# Patient Record
Sex: MALE | Race: OTHER | HISPANIC OR LATINO
[De-identification: names, ages, dates, MRNs, and addresses within clinical notes are randomized per-mention and may not be internally consistent; named-entity substitution may affect disease eponyms.]

---
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
## 2022-07-14 ENCOUNTER — INPATIENT (INPATIENT)
Facility: HOSPITAL | Age: 22
LOS: 0 days | Discharge: HOME | DRG: 343 | End: 2022-07-15
Attending: HOSPITALIST | Admitting: INTERNAL MEDICINE
Payer: COMMERCIAL

## 2022-07-14 ENCOUNTER — TRANSCRIPTION ENCOUNTER (OUTPATIENT)
Age: 22
End: 2022-07-14

## 2022-07-14 VITALS
OXYGEN SATURATION: 98 % | WEIGHT: 190.04 LBS | SYSTOLIC BLOOD PRESSURE: 128 MMHG | RESPIRATION RATE: 16 BRPM | TEMPERATURE: 100 F | HEIGHT: 71 IN | DIASTOLIC BLOOD PRESSURE: 78 MMHG | HEART RATE: 70 BPM

## 2022-07-14 DIAGNOSIS — K35.80 UNSPECIFIED ACUTE APPENDICITIS: ICD-10-CM

## 2022-07-14 LAB
ALBUMIN SERPL ELPH-MCNC: 4.3 G/DL — SIGNIFICANT CHANGE UP (ref 3.3–5)
ALP SERPL-CCNC: 92 U/L — SIGNIFICANT CHANGE UP (ref 40–120)
ALT FLD-CCNC: 23 U/L — SIGNIFICANT CHANGE UP (ref 10–45)
ANION GAP SERPL CALC-SCNC: 11 MMOL/L — SIGNIFICANT CHANGE UP (ref 5–17)
APPEARANCE UR: CLEAR — SIGNIFICANT CHANGE UP
APTT BLD: 31.2 SEC — SIGNIFICANT CHANGE UP (ref 27.5–35.5)
AST SERPL-CCNC: 22 U/L — SIGNIFICANT CHANGE UP (ref 10–40)
BILIRUB SERPL-MCNC: 0.6 MG/DL — SIGNIFICANT CHANGE UP (ref 0.2–1.2)
BILIRUB UR-MCNC: NEGATIVE — SIGNIFICANT CHANGE UP
BLD GP AB SCN SERPL QL: SIGNIFICANT CHANGE UP
BUN SERPL-MCNC: 12 MG/DL — SIGNIFICANT CHANGE UP (ref 7–23)
CALCIUM SERPL-MCNC: 9.4 MG/DL — SIGNIFICANT CHANGE UP (ref 8.4–10.5)
CHLORIDE SERPL-SCNC: 102 MMOL/L — SIGNIFICANT CHANGE UP (ref 96–108)
CO2 SERPL-SCNC: 27 MMOL/L — SIGNIFICANT CHANGE UP (ref 22–31)
COLOR SPEC: YELLOW — SIGNIFICANT CHANGE UP
CREAT SERPL-MCNC: 1.05 MG/DL — SIGNIFICANT CHANGE UP (ref 0.5–1.3)
DIFF PNL FLD: ABNORMAL
EGFR: 105 ML/MIN/1.73M2 — SIGNIFICANT CHANGE UP
GLUCOSE SERPL-MCNC: 125 MG/DL — HIGH (ref 70–99)
GLUCOSE UR QL: NEGATIVE — SIGNIFICANT CHANGE UP
HCT VFR BLD CALC: 38.2 % — LOW (ref 39–50)
HGB BLD-MCNC: 12.8 G/DL — LOW (ref 13–17)
INR BLD: 1.13 RATIO — SIGNIFICANT CHANGE UP (ref 0.88–1.16)
KETONES UR-MCNC: NEGATIVE — SIGNIFICANT CHANGE UP
LACTATE SERPL-SCNC: 1.2 MMOL/L — SIGNIFICANT CHANGE UP (ref 0.7–2)
LEUKOCYTE ESTERASE UR-ACNC: NEGATIVE — SIGNIFICANT CHANGE UP
LIDOCAIN IGE QN: 113 U/L — SIGNIFICANT CHANGE UP (ref 73–393)
MCHC RBC-ENTMCNC: 30.4 PG — SIGNIFICANT CHANGE UP (ref 27–34)
MCHC RBC-ENTMCNC: 33.5 GM/DL — SIGNIFICANT CHANGE UP (ref 32–36)
MCV RBC AUTO: 90.7 FL — SIGNIFICANT CHANGE UP (ref 80–100)
NITRITE UR-MCNC: NEGATIVE — SIGNIFICANT CHANGE UP
NRBC # BLD: 0 /100 WBCS — SIGNIFICANT CHANGE UP (ref 0–0)
PH UR: 7 — SIGNIFICANT CHANGE UP (ref 5–8)
PLATELET # BLD AUTO: 275 K/UL — SIGNIFICANT CHANGE UP (ref 150–400)
POTASSIUM SERPL-MCNC: 3.7 MMOL/L — SIGNIFICANT CHANGE UP (ref 3.5–5.3)
POTASSIUM SERPL-SCNC: 3.7 MMOL/L — SIGNIFICANT CHANGE UP (ref 3.5–5.3)
PROT SERPL-MCNC: 8.1 G/DL — SIGNIFICANT CHANGE UP (ref 6–8.3)
PROT UR-MCNC: NEGATIVE — SIGNIFICANT CHANGE UP
PROTHROM AB SERPL-ACNC: 13.1 SEC — SIGNIFICANT CHANGE UP (ref 10.5–13.4)
RBC # BLD: 4.21 M/UL — SIGNIFICANT CHANGE UP (ref 4.2–5.8)
RBC # FLD: 12.2 % — SIGNIFICANT CHANGE UP (ref 10.3–14.5)
RBC CASTS # UR COMP ASSIST: SIGNIFICANT CHANGE UP /HPF (ref 0–4)
SARS-COV-2 RNA SPEC QL NAA+PROBE: SIGNIFICANT CHANGE UP
SODIUM SERPL-SCNC: 140 MMOL/L — SIGNIFICANT CHANGE UP (ref 135–145)
SP GR SPEC: 1.01 — SIGNIFICANT CHANGE UP (ref 1.01–1.02)
UROBILINOGEN FLD QL: NEGATIVE — SIGNIFICANT CHANGE UP
WBC # BLD: 15.75 K/UL — HIGH (ref 3.8–10.5)
WBC # FLD AUTO: 15.75 K/UL — HIGH (ref 3.8–10.5)
WBC UR QL: NEGATIVE /HPF — SIGNIFICANT CHANGE UP (ref 0–5)

## 2022-07-14 PROCEDURE — 74177 CT ABD & PELVIS W/CONTRAST: CPT | Mod: 26,MA

## 2022-07-14 PROCEDURE — 99222 1ST HOSP IP/OBS MODERATE 55: CPT

## 2022-07-14 PROCEDURE — 99285 EMERGENCY DEPT VISIT HI MDM: CPT

## 2022-07-14 RX ORDER — ACETAMINOPHEN 500 MG
650 TABLET ORAL EVERY 6 HOURS
Refills: 0 | Status: DISCONTINUED | OUTPATIENT
Start: 2022-07-14 | End: 2022-07-15

## 2022-07-14 RX ORDER — PIPERACILLIN AND TAZOBACTAM 4; .5 G/20ML; G/20ML
3.38 INJECTION, POWDER, LYOPHILIZED, FOR SOLUTION INTRAVENOUS ONCE
Refills: 0 | Status: COMPLETED | OUTPATIENT
Start: 2022-07-14 | End: 2022-07-14

## 2022-07-14 RX ORDER — CEFTRIAXONE 500 MG/1
1000 INJECTION, POWDER, FOR SOLUTION INTRAMUSCULAR; INTRAVENOUS EVERY 24 HOURS
Refills: 0 | Status: DISCONTINUED | OUTPATIENT
Start: 2022-07-15 | End: 2022-07-15

## 2022-07-14 RX ORDER — SODIUM CHLORIDE 9 MG/ML
1000 INJECTION INTRAMUSCULAR; INTRAVENOUS; SUBCUTANEOUS ONCE
Refills: 0 | Status: COMPLETED | OUTPATIENT
Start: 2022-07-14 | End: 2022-07-14

## 2022-07-14 RX ORDER — FAMOTIDINE 10 MG/ML
20 INJECTION INTRAVENOUS ONCE
Refills: 0 | Status: COMPLETED | OUTPATIENT
Start: 2022-07-14 | End: 2022-07-14

## 2022-07-14 RX ORDER — KETOROLAC TROMETHAMINE 30 MG/ML
30 SYRINGE (ML) INJECTION ONCE
Refills: 0 | Status: DISCONTINUED | OUTPATIENT
Start: 2022-07-14 | End: 2022-07-14

## 2022-07-14 RX ADMIN — SODIUM CHLORIDE 1000 MILLILITER(S): 9 INJECTION INTRAMUSCULAR; INTRAVENOUS; SUBCUTANEOUS at 21:53

## 2022-07-14 RX ADMIN — SODIUM CHLORIDE 1000 MILLILITER(S): 9 INJECTION INTRAMUSCULAR; INTRAVENOUS; SUBCUTANEOUS at 23:20

## 2022-07-14 RX ADMIN — PIPERACILLIN AND TAZOBACTAM 3.38 GRAM(S): 4; .5 INJECTION, POWDER, LYOPHILIZED, FOR SOLUTION INTRAVENOUS at 23:20

## 2022-07-14 RX ADMIN — SODIUM CHLORIDE 1000 MILLILITER(S): 9 INJECTION INTRAMUSCULAR; INTRAVENOUS; SUBCUTANEOUS at 20:48

## 2022-07-14 RX ADMIN — PIPERACILLIN AND TAZOBACTAM 200 GRAM(S): 4; .5 INJECTION, POWDER, LYOPHILIZED, FOR SOLUTION INTRAVENOUS at 22:04

## 2022-07-14 RX ADMIN — Medication 30 MILLIGRAM(S): at 20:48

## 2022-07-14 RX ADMIN — Medication 30 MILLIGRAM(S): at 22:13

## 2022-07-14 NOTE — H&P ADULT - NSHPPHYSICALEXAM_GEN_ALL_CORE
Vital Signs (24 Hrs):  T(C): 37.9 (07-14-22 @ 19:52), Max: 37.9 (07-14-22 @ 19:52)  HR: 70 (07-14-22 @ 19:52) (70 - 70)  BP: 128/78 (07-14-22 @ 19:52) (128/78 - 128/78)  RR: 16 (07-14-22 @ 19:52) (16 - 16)  SpO2: 98% (07-14-22 @ 19:52) (98% - 98%)  Wt(kg): --  Daily Height in cm: 180.34 (14 Jul 2022 19:52)    Daily     I&O's Summary

## 2022-07-14 NOTE — H&P ADULT - ASSESSMENT
Mother, Jeanie - cell [phone], home [phone] 22 yo m, healthy, no PMHx, was fine until this evening, after dinner, c/o sharp RLQ abdominal pain, 10/10, asso with nausea, had and episode of vomiting.  Pt felt very weak, diaphoretic after, abd pain persistent, so decided to come to the ED.  He denies fever, chills chest pain, diarrhea, dyspnea.    CT Abd/pelvis reports no bowel obstruction. 10 mm fluid-filled appendix with minimal inflammatory changes in the adjacent fat. Findings are suspicious for early appendicitis.    Acute Appendicitis    Admit  NPO, IV hydration while NPO  IV Antibx-Ceftriaxone  Analgesics prn  GI prophylaxis  Surg eval - DR Sebastian was notified by ED  Mother, Jeanie, requests to be called with any update or plann -her favored contact no is cell (first) [phone], then home [phone]

## 2022-07-14 NOTE — ED PROVIDER NOTE - PHYSICAL EXAMINATION
General:     NAD, well-nourished, well-appearing  Eyes: PERRL  Head:     NC/AT, EOMI, oral mucosa moist  Neck:     trachea midline  Lungs:     CTA b/l  CVS:     RRR  Abd:     RLQ ttp, no rebound or guarding, no cva tenderness  Ext:   no deformities   Neuro: AAOx3, no sensory/motor deficits

## 2022-07-14 NOTE — ED PROVIDER NOTE - OBJECTIVE STATEMENT
pt 22 yo m c/o sharp rlq pain that was a 10/10 and now 4/10 associated with nausea that now resolved. feels like it is related to him working outside and not drinking any water  denies fever, chills, dysuria, weakness, diarrhea

## 2022-07-14 NOTE — ED PROVIDER NOTE - NS ED ATTENDING STATEMENT MOD
This was a shared visit with the KEVIN. I reviewed and verified the documentation and independently performed the documented:

## 2022-07-14 NOTE — H&P ADULT - HISTORY OF PRESENT ILLNESS
20 yo m, healthy, no PMHx, was fine until this evening, after dinner, c/o sharp RLQ abdominal pain, 10/10, asso with nausea, had and episode of vomiting.  Pt felt very weak, diaphoretic after, abd pain persistent, so decided to come to the ED.  He denies fever, chills chest pain, diarrhea, dyspnea.   22 yo m, healthy, no PMHx, was fine until this evening, after dinner, c/o sharp RLQ abdominal pain, 10/10, asso with nausea, had and episode of vomiting.  Pt felt very weak, diaphoretic after, abd pain persistent, so decided to come to the ED.  He denies fever, chills chest pain, diarrhea, dyspnea.    CT Abd/pelvis reports no bowel obstruction. 10 mm fluid-filled appendix with minimal inflammatory changes in the adjacent fat. Findings are suspicious for early appendicitis.

## 2022-07-14 NOTE — ED PROVIDER NOTE - ATTENDING APP SHARED VISIT CONTRIBUTION OF CARE
21M with no sig pmh presents with 1 day of abd pain, nausea, chills. Pain started periumbilically, moved to RLQ. In the ED, febrile, tachy, normotensive. Well appearing. TTP in RLQ with guarding. Labs with leukocytosis. CTAP shows appendicitis without abscess or perforation. Pt given fluids, zosyn. Surgery consulted. Admitted for likely operative management.

## 2022-07-14 NOTE — H&P ADULT - NEUROLOGICAL
normal/cranial nerves II-XII intact/sensation intact/responds to pain/responds to verbal commands/deep reflexes intact

## 2022-07-15 ENCOUNTER — TRANSCRIPTION ENCOUNTER (OUTPATIENT)
Age: 22
End: 2022-07-15

## 2022-07-15 ENCOUNTER — RESULT REVIEW (OUTPATIENT)
Age: 22
End: 2022-07-15

## 2022-07-15 VITALS
TEMPERATURE: 98 F | OXYGEN SATURATION: 97 % | SYSTOLIC BLOOD PRESSURE: 132 MMHG | HEART RATE: 68 BPM | DIASTOLIC BLOOD PRESSURE: 77 MMHG | RESPIRATION RATE: 18 BRPM

## 2022-07-15 LAB
BASOPHILS # BLD AUTO: 0.04 K/UL — SIGNIFICANT CHANGE UP (ref 0–0.2)
BASOPHILS NFR BLD AUTO: 0.4 % — SIGNIFICANT CHANGE UP (ref 0–2)
CULTURE RESULTS: SIGNIFICANT CHANGE UP
EOSINOPHIL # BLD AUTO: 0.12 K/UL — SIGNIFICANT CHANGE UP (ref 0–0.5)
EOSINOPHIL NFR BLD AUTO: 1.1 % — SIGNIFICANT CHANGE UP (ref 0–6)
HCT VFR BLD CALC: 38.7 % — LOW (ref 39–50)
HGB BLD-MCNC: 12.8 G/DL — LOW (ref 13–17)
IMM GRANULOCYTES NFR BLD AUTO: 0.3 % — SIGNIFICANT CHANGE UP (ref 0–1.5)
LYMPHOCYTES # BLD AUTO: 2.57 K/UL — SIGNIFICANT CHANGE UP (ref 1–3.3)
LYMPHOCYTES # BLD AUTO: 23.4 % — SIGNIFICANT CHANGE UP (ref 13–44)
MCHC RBC-ENTMCNC: 29.4 PG — SIGNIFICANT CHANGE UP (ref 27–34)
MCHC RBC-ENTMCNC: 33.1 GM/DL — SIGNIFICANT CHANGE UP (ref 32–36)
MCV RBC AUTO: 88.8 FL — SIGNIFICANT CHANGE UP (ref 80–100)
MONOCYTES # BLD AUTO: 1.18 K/UL — HIGH (ref 0–0.9)
MONOCYTES NFR BLD AUTO: 10.7 % — SIGNIFICANT CHANGE UP (ref 2–14)
NEUTROPHILS # BLD AUTO: 7.05 K/UL — SIGNIFICANT CHANGE UP (ref 1.8–7.4)
NEUTROPHILS NFR BLD AUTO: 64.1 % — SIGNIFICANT CHANGE UP (ref 43–77)
NRBC # BLD: 0 /100 WBCS — SIGNIFICANT CHANGE UP (ref 0–0)
PLATELET # BLD AUTO: 273 K/UL — SIGNIFICANT CHANGE UP (ref 150–400)
RBC # BLD: 4.36 M/UL — SIGNIFICANT CHANGE UP (ref 4.2–5.8)
RBC # FLD: 12.3 % — SIGNIFICANT CHANGE UP (ref 10.3–14.5)
SPECIMEN SOURCE: SIGNIFICANT CHANGE UP
WBC # BLD: 10.99 K/UL — HIGH (ref 3.8–10.5)
WBC # FLD AUTO: 10.99 K/UL — HIGH (ref 3.8–10.5)

## 2022-07-15 PROCEDURE — 85027 COMPLETE CBC AUTOMATED: CPT

## 2022-07-15 PROCEDURE — 99222 1ST HOSP IP/OBS MODERATE 55: CPT | Mod: 25,57

## 2022-07-15 PROCEDURE — 87086 URINE CULTURE/COLONY COUNT: CPT

## 2022-07-15 PROCEDURE — 99239 HOSP IP/OBS DSCHRG MGMT >30: CPT

## 2022-07-15 PROCEDURE — 85730 THROMBOPLASTIN TIME PARTIAL: CPT

## 2022-07-15 PROCEDURE — C9399: CPT

## 2022-07-15 PROCEDURE — 87635 SARS-COV-2 COVID-19 AMP PRB: CPT

## 2022-07-15 PROCEDURE — C1889: CPT

## 2022-07-15 PROCEDURE — 96375 TX/PRO/DX INJ NEW DRUG ADDON: CPT

## 2022-07-15 PROCEDURE — 96361 HYDRATE IV INFUSION ADD-ON: CPT

## 2022-07-15 PROCEDURE — 88304 TISSUE EXAM BY PATHOLOGIST: CPT | Mod: 26

## 2022-07-15 PROCEDURE — 80053 COMPREHEN METABOLIC PANEL: CPT

## 2022-07-15 PROCEDURE — 86900 BLOOD TYPING SEROLOGIC ABO: CPT

## 2022-07-15 PROCEDURE — 86850 RBC ANTIBODY SCREEN: CPT

## 2022-07-15 PROCEDURE — 88304 TISSUE EXAM BY PATHOLOGIST: CPT

## 2022-07-15 PROCEDURE — 86901 BLOOD TYPING SEROLOGIC RH(D): CPT

## 2022-07-15 PROCEDURE — 36415 COLL VENOUS BLD VENIPUNCTURE: CPT

## 2022-07-15 PROCEDURE — 81001 URINALYSIS AUTO W/SCOPE: CPT

## 2022-07-15 PROCEDURE — 85025 COMPLETE CBC W/AUTO DIFF WBC: CPT

## 2022-07-15 PROCEDURE — 83690 ASSAY OF LIPASE: CPT

## 2022-07-15 PROCEDURE — 44970 LAPAROSCOPY APPENDECTOMY: CPT

## 2022-07-15 PROCEDURE — 74177 CT ABD & PELVIS W/CONTRAST: CPT | Mod: MA

## 2022-07-15 PROCEDURE — 96365 THER/PROPH/DIAG IV INF INIT: CPT

## 2022-07-15 PROCEDURE — 44970 LAPAROSCOPY APPENDECTOMY: CPT | Mod: AS

## 2022-07-15 PROCEDURE — 83605 ASSAY OF LACTIC ACID: CPT

## 2022-07-15 PROCEDURE — 99285 EMERGENCY DEPT VISIT HI MDM: CPT | Mod: 25

## 2022-07-15 PROCEDURE — 85610 PROTHROMBIN TIME: CPT

## 2022-07-15 DEVICE — STAPLER COVIDIEN TRI-STAPLE 45MM TAN RELOAD: Type: IMPLANTABLE DEVICE | Status: FUNCTIONAL

## 2022-07-15 DEVICE — CLIP APPLIER COVIDIEN ENDOCLIP 5MM: Type: IMPLANTABLE DEVICE | Status: FUNCTIONAL

## 2022-07-15 DEVICE — STAPLER COVIDIEN TRI-STAPLE 30MM GRAY RELOAD: Type: IMPLANTABLE DEVICE | Status: FUNCTIONAL

## 2022-07-15 DEVICE — STAPLER COVIDIEN TRI-STAPLE 60MM PURPLE RELOAD: Type: IMPLANTABLE DEVICE | Status: FUNCTIONAL

## 2022-07-15 DEVICE — STAPLER COVIDIEN TRI-STAPLE 30MM TAN RELOAD: Type: IMPLANTABLE DEVICE | Status: FUNCTIONAL

## 2022-07-15 DEVICE — STAPLER COVIDIEN TRI-STAPLE 30MM PURPLE RELOAD: Type: IMPLANTABLE DEVICE | Status: FUNCTIONAL

## 2022-07-15 DEVICE — CLIP APPLIER COVIDIEN ENDOCLIP II 10MM MED/LG: Type: IMPLANTABLE DEVICE | Status: FUNCTIONAL

## 2022-07-15 RX ORDER — SODIUM CHLORIDE 9 MG/ML
1000 INJECTION, SOLUTION INTRAVENOUS
Refills: 0 | Status: DISCONTINUED | OUTPATIENT
Start: 2022-07-15 | End: 2022-07-15

## 2022-07-15 RX ORDER — OXYCODONE HYDROCHLORIDE 5 MG/1
1 TABLET ORAL
Qty: 16 | Refills: 0
Start: 2022-07-15 | End: 2022-07-18

## 2022-07-15 RX ORDER — LIDOCAINE HCL 20 MG/ML
1 VIAL (ML) INJECTION ONCE
Refills: 0 | Status: COMPLETED | OUTPATIENT
Start: 2022-07-15 | End: 2022-07-15

## 2022-07-15 RX ORDER — SODIUM CHLORIDE 9 MG/ML
1000 INJECTION INTRAMUSCULAR; INTRAVENOUS; SUBCUTANEOUS
Refills: 0 | Status: DISCONTINUED | OUTPATIENT
Start: 2022-07-15 | End: 2022-07-15

## 2022-07-15 RX ORDER — OXYCODONE HYDROCHLORIDE 5 MG/1
10 TABLET ORAL EVERY 4 HOURS
Refills: 0 | Status: DISCONTINUED | OUTPATIENT
Start: 2022-07-15 | End: 2022-07-15

## 2022-07-15 RX ORDER — OXYCODONE HYDROCHLORIDE 5 MG/1
5 TABLET ORAL EVERY 4 HOURS
Refills: 0 | Status: DISCONTINUED | OUTPATIENT
Start: 2022-07-15 | End: 2022-07-15

## 2022-07-15 RX ORDER — ACETAMINOPHEN 500 MG
650 TABLET ORAL EVERY 6 HOURS
Refills: 0 | Status: DISCONTINUED | OUTPATIENT
Start: 2022-07-15 | End: 2022-07-15

## 2022-07-15 RX ORDER — NALOXONE HYDROCHLORIDE 4 MG/.1ML
1 SPRAY NASAL
Qty: 2 | Refills: 0
Start: 2022-07-15 | End: 2022-07-16

## 2022-07-15 RX ORDER — LIDOCAINE 4 G/100G
1 CREAM TOPICAL ONCE
Refills: 0 | Status: DISCONTINUED | OUTPATIENT
Start: 2022-07-15 | End: 2022-07-15

## 2022-07-15 RX ORDER — ONDANSETRON 8 MG/1
4 TABLET, FILM COATED ORAL ONCE
Refills: 0 | Status: DISCONTINUED | OUTPATIENT
Start: 2022-07-15 | End: 2022-07-15

## 2022-07-15 RX ORDER — ONDANSETRON 8 MG/1
4 TABLET, FILM COATED ORAL EVERY 6 HOURS
Refills: 0 | Status: DISCONTINUED | OUTPATIENT
Start: 2022-07-15 | End: 2022-07-15

## 2022-07-15 RX ORDER — OXYCODONE HYDROCHLORIDE 5 MG/1
1 TABLET ORAL
Qty: 8 | Refills: 0
Start: 2022-07-15 | End: 2022-07-16

## 2022-07-15 RX ORDER — HYDROMORPHONE HYDROCHLORIDE 2 MG/ML
0.5 INJECTION INTRAMUSCULAR; INTRAVENOUS; SUBCUTANEOUS
Refills: 0 | Status: DISCONTINUED | OUTPATIENT
Start: 2022-07-15 | End: 2022-07-15

## 2022-07-15 RX ORDER — CHLORHEXIDINE GLUCONATE 213 G/1000ML
1 SOLUTION TOPICAL ONCE
Refills: 0 | Status: DISCONTINUED | OUTPATIENT
Start: 2022-07-15 | End: 2022-07-15

## 2022-07-15 RX ORDER — ENOXAPARIN SODIUM 100 MG/ML
40 INJECTION SUBCUTANEOUS EVERY 24 HOURS
Refills: 0 | Status: DISCONTINUED | OUTPATIENT
Start: 2022-07-16 | End: 2022-07-15

## 2022-07-15 RX ADMIN — OXYCODONE HYDROCHLORIDE 5 MILLIGRAM(S): 5 TABLET ORAL at 18:29

## 2022-07-15 RX ADMIN — FAMOTIDINE 100 MILLIGRAM(S): 10 INJECTION INTRAVENOUS at 04:33

## 2022-07-15 RX ADMIN — CEFTRIAXONE 100 MILLIGRAM(S): 500 INJECTION, POWDER, FOR SOLUTION INTRAMUSCULAR; INTRAVENOUS at 02:31

## 2022-07-15 RX ADMIN — SODIUM CHLORIDE 125 MILLILITER(S): 9 INJECTION INTRAMUSCULAR; INTRAVENOUS; SUBCUTANEOUS at 07:59

## 2022-07-15 RX ADMIN — Medication 1 MILLILITER(S): at 14:24

## 2022-07-15 NOTE — DISCHARGE NOTE PROVIDER - NSDCFUADDINST_GEN_ALL_CORE_FT
OK to shower after 24hrs, allow soapy water to run over abdomen and pat dry. Allow steri strip bandages to fall off by themselves.     No heavy lifting or straining or strenuous activities/gym for at least 6 weeks - follow up with Dr. Sebastian for clearance before returning to work.      Do not drive if taking prescribed narcotic pain medications. OK to use OTC Tylenol and/or Ibuprofen for mild-moderate pain as needed.     Please notify MD sooner or return to the ER with any new or worsening symptoms, uncontrollable pain, foul smelling discharge or drainage from wound, excessive bleeding or swelling, fever >101, chest pain, shortness of breath, abdominal pain, nausea/vomiting, or other concerns/problems.

## 2022-07-15 NOTE — DISCHARGE NOTE PROVIDER - NSDCMRMEDTOKEN_GEN_ALL_CORE_FT
naloxone 4 mg/0.1 mL nasal spray: 1 application intranasally once a day   oxyCODONE 5 mg oral tablet: 1 tab(s) orally every 6 hours, As Needed -for severe pain MDD:4

## 2022-07-15 NOTE — PATIENT PROFILE ADULT - ARE SIGNIFICANT INDICATORS COMPLETE.
Spray Paint Text: The liquid nitrogen was applied to the skin utilizing a spray paint frosting technique. Add 52 Modifier (Optional): no Medical Necessity Clause: This procedure was medically necessary because the lesions that were treated were: Show Aperture Variable?: Yes Medical Necessity Information: It is in your best interest to select a reason for this procedure from the list below. All of these items fulfill various CMS LCD requirements except the new and changing color options. Detail Level: Detailed Post-Care Instructions: I reviewed with the patient in detail post-care instructions. Patient is to wear sunprotection, and avoid picking at any of the treated lesions. Pt may apply Vaseline to crusted or scabbing areas. Consent: The patient's consent was obtained including but not limited to risks of crusting, scabbing, blistering, scarring, darker or lighter pigmentary change, recurrence, incomplete removal and infection. No

## 2022-07-15 NOTE — DISCHARGE NOTE NURSING/CASE MANAGEMENT/SOCIAL WORK - NSDCPEFALRISK_GEN_ALL_CORE
For information on Fall & Injury Prevention, visit: [email or web address] OR  [email or web address] OR  https://www.cdc.gov/steadi/patient.html

## 2022-07-15 NOTE — DISCHARGE NOTE PROVIDER - HOSPITAL COURSE
21M admitted with Acute appendicitis, taken to the OR on 7/15 for laparoscopic appendectomy. Surgery uneventful, no acute events.   Hospital course postop uneventful. Patient did well postop, tolerated advancement of diet and pain controlled. F/u with Dr. Sebastian in the office next Friday.

## 2022-07-15 NOTE — DISCHARGE NOTE PROVIDER - NSDCFUADDAPPT_GEN_ALL_CORE_FT
Please call the office to schedule your appointment for follow up with Surgeon next Friday 7/22.    Call MD sooner with any questions/concerns.

## 2022-07-15 NOTE — DISCHARGE NOTE PROVIDER - CARE PROVIDER_API CALL
Rufino Sebastian)  ColonRectal Surgery; Surgery  [address], [address]  [place], [place] [id number]  Phone: [phone]  Fax: [phone]  Follow Up Time:

## 2022-07-15 NOTE — DISCHARGE NOTE PROVIDER - NSDCFUSCHEDAPPT_GEN_ALL_CORE_FT
Rufino Sebastian  [facility] Physician [address]  Scheduled Appointment: 07/22/2022

## 2022-07-15 NOTE — PROGRESS NOTE ADULT - SUBJECTIVE AND OBJECTIVE BOX
Patient is a 21y old  Male who presents with a chief complaint of acute appendicitis      Patient seen and examined at bedside.    ALLERGIES:  No Known Allergies    MEDICATIONS  (STANDING):  cefTRIAXone   IVPB 1000 milliGRAM(s) IV Intermittent every 24 hours    MEDICATIONS  (PRN):  acetaminophen     Tablet .. 650 milliGRAM(s) Oral every 6 hours PRN Temp greater or equal to 38C (100.4F), Mild Pain (1 - 3)    Vital Signs Last 24 Hrs  T(F): 98.2 (15 Jul 2022 06:18), Max: 100.3 (2022 19:52)  HR: 67 (15 Jul 2022 06:18) (67 - 72)  BP: 136/75 (15 Jul 2022 06:18) (128/78 - 136/75)  RR: 18 (15 Jul 2022 06:18) (16 - 20)  SpO2: 100% (15 Jul 2022 06:18) (98% - 100%)  I&O's Summary    PHYSICAL EXAM:  General: NAD, A/O x 3  ENT: MMM  Neck: Supple, No JVD  Lungs: Clear to auscultation bilaterally, Non labored breathing   Cardio: RRR, S1/S2, No murmurs  Abdomen: Soft, Nontender, Nondistended; Bowel sounds present  Extremities: No calf tenderness, No pitting edema    LABS:                        12.8   10.99 )-----------( 273      ( 15 Jul 2022 06:15 )             38.7     07-14    140  |  102  |  12  ----------------------------<  125  3.7   |  27  |  1.05    Ca    9.4      2022 20:40    TPro  8.1  /  Alb  4.3  /  TBili  0.6  /  DBili  x   /  AST  22  /  ALT  23  /  AlkPhos  92  07-14      PT/INR - ( 2022 21:51 )   PT: 13.1 sec;   INR: 1.13 ratio         PTT - ( 2022 21:51 )  PTT:31.2 sec  Lactate, Blood: 1.2 mmol/L (07-14 @ 21:51)                          Urinalysis Basic - ( 2022 22:10 )    Color: Yellow / Appearance: Clear / S.010 / pH: x  Gluc: x / Ketone: Negative  / Bili: Negative / Urobili: Negative   Blood: x / Protein: Negative / Nitrite: Negative   Leuk Esterase: Negative / RBC: 0-4 /HPF / WBC Negative /HPF   Sq Epi: x / Non Sq Epi: x / Bacteria: x        COVID-19 PCR: NotDetec (22 @ 20:40)    RADIOLOGY & ADDITIONAL TESTS:    Care Discussed with Consultants/Other Providers:    Patient is a 21y old  Male who presents with a chief complaint of acute appendicitis- OR today at 11 am       Patient seen and examined at bedside.  Pt doing well this am, still notes RLQ pain when palpation,  Mother at bedside.      ALLERGIES:  No Known Allergies    MEDICATIONS  (STANDING):  cefTRIAXone   IVPB 1000 milliGRAM(s) IV Intermittent every 24 hours    MEDICATIONS  (PRN):  acetaminophen     Tablet .. 650 milliGRAM(s) Oral every 6 hours PRN Temp greater or equal to 38C (100.4F), Mild Pain (1 - 3)    Vital Signs Last 24 Hrs  T(F): 98.2 (15 Jul 2022 06:18), Max: 100.3 (2022 19:52)  HR: 67 (15 Jul 2022 06:18) (67 - 72)  BP: 136/75 (15 Jul 2022 06:18) (128/78 - 136/75)  RR: 18 (15 Jul 2022 06:18) (16 - 20)  SpO2: 100% (15 Jul 2022 06:18) (98% - 100%)  I&O's Summary    PHYSICAL EXAM:  General: 20 y/o male NAD, A/O x 3  ENT: MMM  Neck: Supple, No JVD  Lungs: Clear to auscultation bilaterally, Non labored breathing   Cardio: RRR, S1/S2, No murmurs  Abdomen: Soft, RLQ tender, Nondistended; Bowel sounds present  Extremities: No calf tenderness, No pitting edema    LABS:                        12.8   10.99 )-----------( 273      ( 15 Jul 2022 06:15 )             38.7     07-14    140  |  102  |  12  ----------------------------<  125  3.7   |  27  |  1.05    Ca    9.4      2022 20:40    TPro  8.1  /  Alb  4.3  /  TBili  0.6  /  DBili  x   /  AST  22  /  ALT  23  /  AlkPhos  92  07-14      PT/INR - ( 2022 21:51 )   PT: 13.1 sec;   INR: 1.13 ratio         PTT - ( 2022 21:51 )  PTT:31.2 sec  Lactate, Blood: 1.2 mmol/L ( @ 21:51)                          Urinalysis Basic - ( 2022 22:10 )    Color: Yellow / Appearance: Clear / S.010 / pH: x  Gluc: x / Ketone: Negative  / Bili: Negative / Urobili: Negative   Blood: x / Protein: Negative / Nitrite: Negative   Leuk Esterase: Negative / RBC: 0-4 /HPF / WBC Negative /HPF   Sq Epi: x / Non Sq Epi: x / Bacteria: x        COVID-19 PCR: NotDetec (22 @ 20:40)    RADIOLOGY & ADDITIONAL TESTS:    Care Discussed with Consultants/Other Providers:

## 2022-07-15 NOTE — DISCHARGE NOTE NURSING/CASE MANAGEMENT/SOCIAL WORK - PATIENT PORTAL LINK FT
You can access the FollowMyHealth Patient Portal offered by [facility] by registering at the following website: [email or web address]. By joining [facility]’s FollowMyHealth portal, you will also be able to view your health information using other applications (apps) compatible with our system.

## 2022-07-15 NOTE — PATIENT PROFILE ADULT - FALL HARM RISK - UNIVERSAL INTERVENTIONS
Bed in lowest position, wheels locked, appropriate side rails in place/Call bell, personal items and telephone in reach/Instruct patient to call for assistance before getting out of bed or chair/Non-slip footwear when patient is out of bed/[place] to call system/Physically safe environment - no spills, clutter or unnecessary equipment/Purposeful Proactive Rounding/Room/bathroom lighting operational, light cord in reach

## 2022-07-15 NOTE — PROGRESS NOTE ADULT - NS ATTEND AMEND GEN_ALL_CORE FT
Pt seen and examined at bedside.  No acute events overnight  Pt denies cp, palpitations, sob, N/V, fever, chills  Pt w/ acute appendicitis s/p uncomplicated lap Appy today    Anticipate discharge home if tolerating diet

## 2022-07-15 NOTE — CONSULT NOTE ADULT - ASSESSMENT
IMPRESSION: Acute appendicitis    PLAN: Operative and nonoperative approaches were discussed with the patient and his mother           The patient will undergo a LAP AP this morning

## 2022-07-15 NOTE — DISCHARGE NOTE PROVIDER - NSDCCPCAREPLAN_GEN_ALL_CORE_FT
PRINCIPAL DISCHARGE DIAGNOSIS  Diagnosis: Acute appendicitis  Assessment and Plan of Treatment: S/p laparoscopic appendectomy 7/15

## 2022-07-15 NOTE — CONSULT NOTE ADULT - TIME BILLING
All options and risks were discussed with patient and his mother; all lab values and imaging studies reviewed; discussed with Medicine

## 2022-07-15 NOTE — CONSULT NOTE ADULT - SUBJECTIVE AND OBJECTIVE BOX
This 21 year old man developed sharp RLQ pain yesterday afternoon. He denied nausea, vomiting, fever, chills, nor previous similar pain. He presented to the ED late last night where a CT scan demonstrated "early" acute appendicitis.      PAST MEDICAL & SURGICAL HISTORY:  No pertinent past medical history.  No pertinent surgical procedures.    PFSH: All noncontributory    MEDICATIONS REVIEWED:acetaminophen     Tablet .. 650 milliGRAM(s) Oral every 6 hours PRN  cefTRIAXone   IVPB 1000 milliGRAM(s) IV Intermittent every 24 hours  chlorhexidine 4% Liquid 1 Application(s) Topical once  sodium chloride 0.9%. 1000 milliLiter(s) IV Continuous <Continuous>      ALLERGIES:No Known Allergies      REVIEW OF SYSTEMS:  Comprehensive Review of Systems negative except as noted in HPI      PHYSICAL EXAMINATION:  RESPIRATORY: Clear to auscultation bilaterally, respirations non-labored.  CARDIAC: RR, normal S1S2, no murmurs.  ABDOMEN: Tender RLQ, soft, BS present, no masses, no hernias, no peritoneal signs, no KLS  VASCULAR: Equal and normal pulses.  MUSCULOSKELETAL: Full ROM, no deformities.      T(C): 36.9 (07-15-22 @ 10:24), Max: 37.9 (07-14-22 @ 19:52)  HR: 76 (07-15-22 @ 10:24) (67 - 76)  BP: 141/88 (07-15-22 @ 10:24) (128/78 - 141/88)  RR: 18 (07-15-22 @ 10:24) (16 - 20)  SpO2: 100% (07-15-22 @ 10:24) (98% - 100%)                          12.8   10.99 )-----------( 273      ( 15 Jul 2022 06:15 )             38.7       07-14    140  |  102  |  12  ----------------------------<  125<H>  3.7   |  27  |  1.05    Ca    9.4      14 Jul 2022 20:40    TPro  8.1  /  Alb  4.3  /  TBili  0.6  /  DBili  x   /  AST  22  /  ALT  23  /  AlkPhos  92  07-14

## 2022-07-15 NOTE — DISCHARGE NOTE PROVIDER - TIME SPENT: (MINUTES SPENT ON THE DISCHARGE SERVICE)
[address] [facility]  Discharge- Amilcar Daniel 1955, 61 y o  male MRN: [id number]  Unit/Bed#: -01 Encounter: [id number]  Primary Care Provider: No primary care provider on file  Date and time admitted to hospital: 1/24/2022  1:36 PM    * Acute respiratory failure with hypoxia ([address] )  Assessment & Plan  · Presented on admission in ER desaturated to 87%, requiring 3L via NC for support  · Treatment as below, wean O2 as tolerated to maintain >88% given underlying COPD diagnosis   · Patient qualified for home o2 at 2L  Pneumonia due to COVID-19 virus  Assessment & Plan  · Present on admission, patient unvaccinated  Reports shortness of breath and chest pain at home  + sick contact with wife at home  Reported 90% on RA by EMS    Currently on MILD COVID pathway:  · CXR on admit with bibasilar infiltrates consistent with COVID-19 pneumonia  · Continue O2 supplement, goal >88% O2 sat  · Will complete IV Remdesivir today    · Continue IV Decadron 6 mg daily, day 5 of 10  · Continue lovenox (ddimer 0 71)  · Mucinex, tessalon, albuterol prn  · Encourage patient to self-prone, incentive spirometer, OOB as appropriate  · Current oxygenation 93% on 3 L   · Monitor inflammatory markers q 2-3 days  · D-dimer 0 71, CRP 84 , NT-,  Procalcitonin 0 23 ->0 25 ->0 18  · Leukopenia likely secondary to viral illness, improving  · CK improved, no further checks indicated    Hypothyroidism  Assessment & Plan  · Continue levothyroxine per home dose, TSH wnl     Diabetes mellitus without complication [facility])  Assessment & Plan  Lab Results   Component Value Date    HGBA1C 7 2 (A) 11/15/2021       Recent Labs     01/27/22  0711 01/27/22  1114 01/27/22  1712 01/27/22 2054   POCGLU 205* 135 115 199*       Blood Sugar Average: Last 72 hrs:  (P) 709 [id number]     · Restart amaryl and metformin on discharge    Chronic obstructive pulmonary disease (HCC)  Assessment & Plan  · No wheezing or rhonchi on exam, does not appear to have an acute exacerbation at this time  · Continue with breathing treatments and home medications    Medical Problems             Resolved Problems  Date Reviewed: 1/28/2022          Resolved    Lactic acid acidosis 1/26/2022     Resolved by  Dominic Walls PA-C              Discharging Physician / Practitioner: VIVIANA Amador  PCP: No primary care provider on file  Admission Date:   Admission Orders (From admission, onward)     Ordered        01/24/22 1557  Inpatient Admission  Once                      Discharge Date: 01/28/22    Consultations During Hospital Stay:  None    Procedures Performed:   · None    Significant Findings / Test Results:   XR chest 1 view portable   ED Interpretation by Blanche Glasgow MD (01/24 5597)   COVID      Final Result by Rosi Fuller MD (01/24 1516)      Findings suspicious for early bibasal Covid pneumonia                  Workstation performed: [id number]             Incidental Findings:   ·  None    Test Results Pending at Discharge (will require follow up): · None     Outpatient Tests Requested:  · Follow up with PCP within 1 week    Complications:  None    Reason for Admission: Acute respiratory failure with hypoxia    Hospital Course:   Roque Hunt is a 61 y o  male patient with past medical history of hypertension, COPD, DM, HLD, Hypothyroidism who originally presented to the hospital on 1/24/2022 due to shortness of breath, chest pain with breathing  Patient found to be covid-19 positive on 1/24; close contact from patients wife  Patient admitted and treated with IV Decadron/Remdesivir; He required 3-4L supplemental oxygen; he had a home oxygen evaluation and was deemed to need oxygen on discharge to home which was ordered by case management  Vital signs remained stable, labs also remained stable  He was stable for discharge  Please see above list of diagnoses and related plan for additional information  Condition at Discharge: stable    Discharge Day Visit / Exam:   Subjective:  Patient resting in the recliner, wants to go home  Denies complaints of chest pain, shortness of breath, fever or chills  Vitals: Blood Pressure: (!) 143/44 (01/28/22 0539)  Pulse: 64 (01/28/22 0648)  Temperature: 98 5 °F (36 9 °C) (01/28/22 0648)  Temp Source: Oral (01/28/22 0004)  Respirations: 18 (01/28/22 0648)  SpO2: 93 % (01/28/22 3995)     Exam:   Physical Exam  Vitals and nursing note reviewed  Constitutional:       General: He is not in acute distress  Appearance: He is obese  Cardiovascular:      Rate and Rhythm: Normal rate  Pulses: Normal pulses  Heart sounds: Normal heart sounds  Pulmonary:      Effort: Pulmonary effort is normal       Breath sounds: Normal breath sounds  Abdominal:      General: Bowel sounds are normal       Palpations: Abdomen is soft  Musculoskeletal:         General: No tenderness  Normal range of motion  Skin:     General: Skin is warm and dry  Capillary Refill: Capillary refill takes less than 2 seconds  Neurological:      Mental Status: He is alert and oriented to person, place, and time  Mental status is at baseline  Psychiatric:         Mood and Affect: Mood normal           Discussion with Family: Updated  (sister) via phone  Both sisters, Rosemarie Murdock and Richa Nance  Discharge instructions/Information to patient and family:   See after visit summary for information provided to patient and family  Provisions for Follow-Up Care:  See after visit summary for information related to follow-up care and any pertinent home health orders  Disposition:   Home with [facility]NA Services (Reminder: Complete face to face encounter)    Planned Readmission: None     Discharge Statement:  I spent 35 minutes discharging the patient  This time was spent on the day of discharge  I had direct contact with the patient on the day of discharge   Greater than 50% of the total time was spent examining patient, answering all patient questions, arranging and discussing plan of care with patient as well as directly providing post-discharge instructions  Additional time then spent on discharge activities  Discharge Medications:  See after visit summary for reconciled discharge medications provided to patient and/or family        **Please Note: This note may have been constructed using a voice recognition system** 34

## 2022-07-18 PROBLEM — Z78.9 OTHER SPECIFIED HEALTH STATUS: Chronic | Status: ACTIVE | Noted: 2022-07-14

## 2022-07-22 ENCOUNTER — APPOINTMENT (OUTPATIENT)
Dept: SURGERY | Facility: CLINIC | Age: 22
End: 2022-07-22

## 2022-07-22 DIAGNOSIS — Z87.19 PERSONAL HISTORY OF OTHER DISEASES OF THE DIGESTIVE SYSTEM: ICD-10-CM

## 2022-07-22 LAB — SURGICAL PATHOLOGY STUDY: SIGNIFICANT CHANGE UP

## 2022-07-22 PROCEDURE — 99024 POSTOP FOLLOW-UP VISIT: CPT

## 2022-07-22 NOTE — PHYSICAL EXAM
[Abdominal Masses] : No abdominal masses [Abdomen Tenderness] : ~T ~M No abdominal tenderness [de-identified] : wounds healing well

## 2023-11-29 NOTE — PATIENT PROFILE ADULT - BRAND OF COVID-19 VACCINATION
Called back Tesha, who reported pt has been having a \"stiff neck.\" Advised to take pt to ENT for further evaluation. Tesha verbalized understanding and agreed to call pt's ENT doctor to schedule an appt. Routed to Dr Alejandro.    Moderna dose 1 and 2

## 2025-06-18 NOTE — DISCHARGE NOTE PROVIDER - NSDCACTIVITY_GEN_ALL_CORE
Late entry  Secondary to pt found asleep on top of her  baby day of discharge and alternative medications found in her home pill bottles, I wanted to query the prescription data base. I did not have access to this so I called with ER and Dr. Cespedes printed out a copy of this for me.     Do not drive or operate machinery/Showering allowed/Do not make important decisions/Stairs allowed/Walking - Indoors allowed/No heavy lifting/straining/Walking - Outdoors allowed/Follow Instructions Provided by your Surgical Team

## (undated) DEVICE — SOL IRR POUR NS 0.9% 500ML

## (undated) DEVICE — ENDOCATCH 10MM SPECIMEN POUCH

## (undated) DEVICE — TUBING W FILTER STERILE FOR INSUFFLATION

## (undated) DEVICE — LIGASURE BLUNT TIP NANO CTD 37CM

## (undated) DEVICE — DISSECTOR ENDOSCOPIC KITTNER SINGLE TIP

## (undated) DEVICE — POSITIONER FOAM HEAD CRADLE

## (undated) DEVICE — CANISTER SUCTION LID GUARD 3000CC

## (undated) DEVICE — DRAPE TOWEL BLUE 17" X 24"

## (undated) DEVICE — SOL ANTI FOG

## (undated) DEVICE — CONTAINER SPECIMEN PET

## (undated) DEVICE — WRAP COMPRESSION CALF LG

## (undated) DEVICE — SUT POLYSORB 0 30" GS-23

## (undated) DEVICE — WRAP COMPRESSION CALF MED

## (undated) DEVICE — SUT POLYSORB 4-0 30" C-13 UNDYED

## (undated) DEVICE — GLV 7.5 PROTEXIS

## (undated) DEVICE — SPONGE LAP X-RAY DETECTABLE 18X18"

## (undated) DEVICE — TROCAR COVIDIEN VERSASTEP PLUS 11MM

## (undated) DEVICE — NDL COVIDIEN 14G INSUFFLATION NEEDLE

## (undated) DEVICE — PACK MINOR

## (undated) DEVICE — GLV 6.5 PROTEXIS

## (undated) DEVICE — GLV 7 PROTEXIS

## (undated) DEVICE — TROCAR COVIDIEN VERSASTEP 5MM

## (undated) DEVICE — PREP CHLORAPREP ORANGE 2PCT 26ML

## (undated) DEVICE — STAPLER COVIDIEN ENDO GIA XL HANDLE

## (undated) DEVICE — SOL IRR POUR H2O 1500ML

## (undated) DEVICE — POSITIONER STRAP ARMBOARD VELCRO TS-30 12

## (undated) DEVICE — PREP SCRUB IODO DURAPREP 26CC

## (undated) DEVICE — BLANKET WARMER UPPER ADULT

## (undated) DEVICE — DRSG STERISTRIPS 0.5X4"

## (undated) DEVICE — Device

## (undated) DEVICE — STAPLER COVIDIEN ENDO GIA STANDARD HANDLE

## (undated) DEVICE — TROCAR COVIDIEN VERSASTEP PLUS 12MM W SLEEVE

## (undated) DEVICE — LIGASURE MARYLAND JAW LAPAROSCOPIC SEALER 37CM

## (undated) DEVICE — GLV 8 PROTEXIS